# Patient Record
Sex: FEMALE | Race: WHITE | URBAN - METROPOLITAN AREA
[De-identification: names, ages, dates, MRNs, and addresses within clinical notes are randomized per-mention and may not be internally consistent; named-entity substitution may affect disease eponyms.]

---

## 2017-07-31 ENCOUNTER — APPOINTMENT (OUTPATIENT)
Age: 57
Setting detail: DERMATOLOGY
End: 2017-08-01

## 2017-07-31 DIAGNOSIS — L98.0 PYOGENIC GRANULOMA: ICD-10-CM

## 2017-07-31 PROCEDURE — OTHER BIOPSY BY SHAVE METHOD: OTHER

## 2017-07-31 PROCEDURE — OTHER COUNSELING: OTHER

## 2017-07-31 PROCEDURE — 11100: CPT

## 2017-07-31 PROCEDURE — OTHER MIPS QUALITY: OTHER

## 2017-07-31 ASSESSMENT — LOCATION SIMPLE DESCRIPTION DERM: LOCATION SIMPLE: RIGHT SMALL FINGER

## 2017-07-31 ASSESSMENT — LOCATION DETAILED DESCRIPTION DERM: LOCATION DETAILED: RIGHT SMALL FINGERTIP

## 2017-07-31 ASSESSMENT — LOCATION ZONE DERM: LOCATION ZONE: FINGER

## 2017-07-31 NOTE — PROCEDURE: MIPS QUALITY
Quality 130: Documentation Of Current Medications In The Medical Record: Current Medications Documented
Detail Level: Detailed
Quality 226: Preventive Care And Screening: Tobacco Use: Screening And Cessation Intervention: Patient screened for tobacco and never smoked
Quality 431: Preventive Care And Screening: Unhealthy Alcohol Use - Screening: Patient screened for unhealthy alcohol use using a single question and scores less than 2 times per year
Quality 265: Biopsy Follow-Up: Biopsy results reviewed, communicated, tracked, and documented
Quality 110: Preventive Care And Screening: Influenza Immunization: Influenza Immunization previously received during influenza season

## 2017-07-31 NOTE — PROCEDURE: BIOPSY BY SHAVE METHOD
Dressing: bandage
Hemostasis: Electrocautery
Bill For Surgical Tray: no
Anesthesia Volume In Cc (Will Not Render If 0): 0.2
Type Of Destruction Used: Electrodesiccation
Silver Nitrate Text: The wound bed was treated with silver nitrate after the biopsy was performed.
Post-Care Instructions: I reviewed with the patient in detail post-care instructions. Patient is to keep the biopsy site dry overnight, and then apply H2O2, Vaseline and a bandage daily until healed.
X Size Of Lesion In Cm: 0.8
Render Post-Care Instructions In Note?: yes
Notification Instructions: Patient will be notified of biopsy results. However, patient instructed to call the office if not contacted within 2 weeks.
Additional Anesthesia Volume In Cc (Will Not Render If 0): 0
Detail Level: Detailed
Billing Type: Third-Party Bill
Curettage Text: The wound bed was treated with curettage after the biopsy was performed.
Biopsy Type: H and E
Electrodesiccation And Curettage Text: The wound bed was treated with electrodesiccation and curettage after the biopsy was performed.
Electrodesiccation Text: The wound bed was treated with electrodesiccation after the biopsy was performed.
Biopsy Method: Double edge Personna blades
Consent: Written consent was obtained and risks were reviewed including but not limited to scarring, infection, bleeding, scabbing, incomplete removal, nerve damage and allergy to anesthesia.
Anesthesia Type: 1% lidocaine, 0.25% Marcaine, 1:600,000 epinephrine and a 1:10 solution of sodium bicarbonate
Cryotherapy Text: The wound bed was treated with cryotherapy after the biopsy was performed.
Wound Care: Vaseline
Body Location Override (Optional - Billing Will Still Be Based On Selected Body Map Location If Applicable): right distal 5th phalanx

## 2017-07-31 NOTE — HPI: SKIN LESION
Additional History: She previously saw Dr. Dario Solis for shave removal and electrodessication. When the lesion recurred he told the patient that it would need to be surgically removed and sutures would need to be used. The patient was here for an appointment with her mother and mentioned this lesion to Dr. Musa.

## 2024-01-22 ENCOUNTER — NURSE TRIAGE (OUTPATIENT)
Dept: NURSING | Facility: CLINIC | Age: 64
End: 2024-01-22

## 2024-01-22 ENCOUNTER — OFFICE VISIT (OUTPATIENT)
Dept: AUDIOLOGY | Facility: CLINIC | Age: 64
End: 2024-01-22
Payer: COMMERCIAL

## 2024-01-22 DIAGNOSIS — H90.3 SNHL (SENSORY-NEURAL HEARING LOSS), ASYMMETRICAL: Primary | ICD-10-CM

## 2024-01-22 PROCEDURE — 92565 STENGER TEST PURE TONE: CPT | Performed by: AUDIOLOGIST

## 2024-01-22 PROCEDURE — 92557 COMPREHENSIVE HEARING TEST: CPT | Performed by: AUDIOLOGIST

## 2024-01-22 PROCEDURE — 92550 TYMPANOMETRY & REFLEX THRESH: CPT | Performed by: AUDIOLOGIST

## 2024-01-22 NOTE — PROGRESS NOTES
AUDIOLOGY REPORT    SUMMARY: Audiology visit completed. See audiogram for results.      RECOMMENDATIONS: Follow-up with ENT.    Titus Palmer, CCC-A  [place] Licensed Audiologist #[id number]

## 2024-01-22 NOTE — TELEPHONE ENCOUNTER
Maryana is currently in Urgent Care and is not able to make the Audiology appt that was previously made for her.   She is being seen in Urgent Care for another issue.  RN cancelled her appt and rescheduled her for:   Ramila Lind at [phone] [place] [address]  '[address]

## 2024-01-22 NOTE — TELEPHONE ENCOUNTER
"  Nurse Triage SBAR    Is this a 2nd Level Triage? YES, LICENSED PRACTITIONER REVIEW IS REQUIRED    Situation: Right ear hearing loss    Background: One week ago noted \"machine/fan\" running in her head  Hearing more deeply in head instead of outside for several days  On Friday babsat in loud environment and following heard the louder fan in head  On Sat she put in earplug to see if that helped because she felt like noise aggravated it. Since removing the earplug she hears very little. She cannot hear on the phone , TV is hard to hear.     Assessment: Hearing loss    Protocol Recommended Disposition:   See in Office Today or Tomorrow    Recommendation: Appointment made for audiogram today at [facility]. Clinic information given. Asked her to bring her insurance card. Asked her to call back if she cannot make the appt or anything changes.      Routed to provider   Sudden Hearing Loss Patient scheduled.    Date & Time of Audiogram: 1/22/24  11:30 am    Location of Audiogram: [facility]    How the patient wants to be contacted by the team after the audiogram is reviewed: Phone [phone].     Patient has vacation plans on 1/24 and needs to know if she can still go      Lanny Alarcon RN  P Red Flag Triage/MRT       Additional Information   Negative: Followed an ear injury   Negative: Decreased hearing with nasal allergies   Negative: Part of a cold   Negative: Follows air travel or mountain driving   Negative: Earwax, questions about   Negative: Dizziness is the main symptom   Negative: Patient sounds very sick or weak to the triager   Negative: Ringing in the ears (tinnitus) and taking aspirin and dosage sounds high (i.e., > 1500 mg/day)   Hearing loss in one or both ears of sudden onset and present now    Answer Assessment - Initial Assessment Questions  1. DESCRIPTION: \"What type of hearing problem are you having? Describe it for me.\" (e.g., complete hearing loss, partial loss)      Very faint noises  2. LOCATION: \"One or " "both ears?\" If one, ask: \"Which ear?\"      Right ear  3. SEVERITY: \"Can you hear anything?\" If so, ask: \"What can you hear?\" (e.g., ticking watch, whisper, talking)      No whisper, no TV  4. ONSET: \"When did this begin?\" \"Did it start suddenly or come on gradually?\"      One week ago  5. PATTERN: \"Does this come and go, or has it been constant since it started?\"      constant  6. PAIN: \"Is there any pain in your ear(s)?\"  (Scale 1-10; or mild, moderate, severe)      no  7. CAUSE: \"What do you think is causing this hearing problem?\"      Not sure  8. OTHER SYMPTOMS: \"Do you have any other symptoms?\" (e.g., dizziness, ringing in ears)      Slight headache, back of head  9. PREGNANCY: \"Is there any chance you are pregnant?\" \"When was your last menstrual period?\"      na    Protocols used: Hearing Loss-A-[place]    "

## 2024-01-23 ENCOUNTER — TELEPHONE (OUTPATIENT)
Dept: OTOLARYNGOLOGY | Facility: CLINIC | Age: 64
End: 2024-01-23
Payer: COMMERCIAL

## 2024-01-23 DIAGNOSIS — H90.3 ASYMMETRICAL SENSORINEURAL HEARING LOSS: Primary | ICD-10-CM

## 2024-01-23 RX ORDER — PREDNISONE 10 MG/1
TABLET ORAL
Qty: 75 TABLET | Refills: 0 | Status: SHIPPED | OUTPATIENT
Start: 2024-01-23 | End: 2024-02-07

## 2024-01-23 NOTE — TELEPHONE ENCOUNTER
Writer called Maryana to discuss her audiogram. Briefly discussed possible etiologies of sudden hearing loss and treatment of steroids. Explained the purpose of an MRI and both will be ordered today. She is going on vacation for a week and let her know it is acceptable for her to wait until she returns for the MRI. Additionally, she will have a repeat audiogram in 2 weeks after she has completed the steroid course. With a word rec of 36% in the bad ear, she has a better prognosis for some recovery of hearing compared to a word rec of 0%. We will check in with her later today as she is very anxious. She mentioned claustrophobia but has some benzodiazepines she can take prior to MRI.    Hilda Becker PA-C pg via [facility] or [employer]  Otolaryngology-Head & Neck Surgery  Please contact ENT by dialing [phone] and entering job code [phone].

## 2024-01-24 ENCOUNTER — TELEPHONE (OUTPATIENT)
Dept: OTOLARYNGOLOGY | Facility: CLINIC | Age: 64
End: 2024-01-24
Payer: COMMERCIAL

## 2024-01-25 NOTE — TELEPHONE ENCOUNTER
Called and spoke to patient regarding steroids, MRI, and audiogram. Informed patient that audiogram should be done in about 2 weeks, MRI whenever is convenient for her (scheduled for 2/20/24), and to finish steroid doses (should be finished 2/6/24). Schedulers will call patient to schedule audiogram sometime around the dates of 2/7-9. Patient had no further questions or concerns at this time.     Glendy Streeter, RN, BSN  RN Care Coordinator, ENT Clinic

## 2024-01-29 ENCOUNTER — TELEPHONE (OUTPATIENT)
Dept: OTOLARYNGOLOGY | Facility: CLINIC | Age: 64
End: 2024-01-29
Payer: COMMERCIAL

## 2024-02-07 ENCOUNTER — OFFICE VISIT (OUTPATIENT)
Dept: AUDIOLOGY | Facility: CLINIC | Age: 64
End: 2024-02-07
Payer: COMMERCIAL

## 2024-02-07 ENCOUNTER — OFFICE VISIT (OUTPATIENT)
Dept: OTOLARYNGOLOGY | Facility: CLINIC | Age: 64
End: 2024-02-07
Payer: COMMERCIAL

## 2024-02-07 VITALS — DIASTOLIC BLOOD PRESSURE: 79 MMHG | SYSTOLIC BLOOD PRESSURE: 172 MMHG

## 2024-02-07 DIAGNOSIS — Z86.69 H/O SUDDEN HEARING LOSS: ICD-10-CM

## 2024-02-07 DIAGNOSIS — H90.3 ASYMMETRICAL SENSORINEURAL HEARING LOSS: Primary | ICD-10-CM

## 2024-02-07 DIAGNOSIS — H90.3 SNHL (SENSORY-NEURAL HEARING LOSS), ASYMMETRICAL: Primary | ICD-10-CM

## 2024-02-07 PROCEDURE — 92557 COMPREHENSIVE HEARING TEST: CPT | Performed by: AUDIOLOGIST

## 2024-02-07 PROCEDURE — 92550 TYMPANOMETRY & REFLEX THRESH: CPT | Performed by: AUDIOLOGIST

## 2024-02-07 PROCEDURE — 99204 OFFICE O/P NEW MOD 45 MIN: CPT | Performed by: OTOLARYNGOLOGY

## 2024-02-07 ASSESSMENT — ENCOUNTER SYMPTOMS
EYES NEGATIVE: 1
GASTROINTESTINAL NEGATIVE: 1
CONSTITUTIONAL NEGATIVE: 1
RESPIRATORY NEGATIVE: 1
DIZZINESS: 0
NERVOUS/ANXIOUS: 1

## 2024-02-07 NOTE — PROGRESS NOTES
AUDIOLOGY REPORT    SUMMARY: Audiology visit completed. See audiogram for results.    RECOMMENDATIONS: Follow-up with ENT.    Titus Daley  Doctor of Audiology  [place] License # [id number]

## 2024-02-07 NOTE — PROGRESS NOTES
Chief Complaint   Patient presents with    New Patient     sudden hearing loss      PCP: No Ref-Primary, Physician     Referring Provider: No ref. provider found    BP (!) 172/79 (BP Location: Right arm, Patient Position: Sitting, Cuff Size: Adult Regular)     ENT Problem List:  There is no problem list on file for this patient.     Current Medications:  Current Outpatient Medications   Medication    predniSONE (DELTASONE) 10 MG tablet     No current facility-administered medications for this visit.     HPI  Pleasant 63 year old female presents today as a(n) new patient for sudden hearing loss and tinnitus in the right ear that onset 3 weeks ago on 1/15/2024. She reports a muffled sound that gets worse with a lot of simulation, and it feels like there is a door shut on her ear. These symptoms are causing her anxiety. She denies head trauma, and hypertension. She states that she has prediabetes. She had a low grade fever of 99 twice recently, which both went away with Tylenol. She is currently being treated with prednisone with no improvement. She denies otalgia, aural fullness, otorrhea, and dizziness. No weakness, numbness, tingling, trauma hx of vision issues.    Review of Systems   Constitutional: Negative.    HENT:  Positive for hearing loss and tinnitus. Negative for ear discharge and ear pain.    Eyes: Negative.    Respiratory: Negative.     Gastrointestinal: Negative.    Skin: Negative.    Neurological:  Negative for dizziness.   Endo/Heme/Allergies: Negative.    Psychiatric/Behavioral:  The patient is nervous/anxious.        Physical Exam  Vitals and nursing note reviewed.   Constitutional:       Appearance: Normal appearance.   HENT:      Head: Normocephalic and atraumatic.      Jaw: There is normal jaw occlusion.      Right Ear: Hearing, tympanic membrane and ear canal normal.      Left Ear: Hearing, tympanic membrane and ear canal normal.      Nose: No mucosal edema, congestion or rhinorrhea.      Right  Nostril: No occlusion.      Left Nostril: No occlusion.      Right Turbinates: Not enlarged or swollen.      Left Turbinates: Not enlarged or swollen.      Right Sinus: No maxillary sinus tenderness or frontal sinus tenderness.      Left Sinus: No maxillary sinus tenderness or frontal sinus tenderness.      Mouth/Throat:      Mouth: Mucous membranes are moist.      Pharynx: Oropharynx is clear. Uvula midline.   Eyes:      Extraocular Movements: Extraocular movements intact.      Pupils: Pupils are equal, round, and reactive to light.   Neurological:      Mental Status: She is alert.     AUDIOGRAM: The patient underwent an audiogram today.  Right: Speech reception threshold is 40 dB with 92% word recognition. Tympanogram A type.  Left: Speech reception threshold is 0 dB with 100% word recognition. Tympanogram A type.    A/P  Audiogram was independently reviewed and discussed in detail with the patient. There might be some clogging in a vessel in the ear, or viral ear infections. A direct number was given for the patient to schedule an MRI as soon as possible for further investigation, since she is currently scheduled several weeks out. Potential further treatments and risks are thoroughly discussed with the patient, such as a high dose steroid directed through the membrane in the middle ear, hyperbaric oxygen, or hearing aids. The patient is anxious, therefore they are reassured that they have time to think about the treatment options and can call back on the direct number given when they are ready.    Scribe/Staff:    Scribe Disclosure:   I, Barbara España, am serving as a scribe; to document services personally performed by Cherie Gray MD based on data collection and the provider's statements to me.     Provider Disclosure:  I agree with above History, Review of Systems, Physical exam and Plan.  I have reviewed the content of the documentation and have edited it as needed. I have personally performed the  services documented here and the documentation accurately represents those services and the decisions I have made.      Electronically signed by:  Cherie Gray MD

## 2024-02-07 NOTE — NURSING NOTE
Maryana Judd's chief complaint for this visit includes:  Chief Complaint   Patient presents with    New Patient     sudden hearing loss     PCP: No Ref-Primary, Physician    Referring Provider:  No referring provider defined for this encounter.    BP (!) 172/79 (BP Location: Right arm, Patient Position: Sitting, Cuff Size: Adult Regular)   Data Unavailable      No Known Allergies      Do you need any medication refills at today's visit? No    Lisa montanez Clinic Assistant- Surgical Specialties

## 2024-02-07 NOTE — LETTER
2/7/2024         RE: Maryana Judd  [address]  [facility] [address]        Dear Colleague,    Thank you for referring your patient, Maryana Judd, to the [facility]. Please see a copy of my visit note below.    Chief Complaint   Patient presents with     New Patient     sudden hearing loss      PCP: No Ref-Primary, Physician     Referring Provider: No ref. provider found    BP (!) 172/79 (BP Location: Right arm, Patient Position: Sitting, Cuff Size: Adult Regular)     ENT Problem List:  There is no problem list on file for this patient.     Current Medications:  Current Outpatient Medications   Medication     predniSONE (DELTASONE) 10 MG tablet     No current facility-administered medications for this visit.     HPI  Pleasant 63 year old female presents today as a(n) new patient for sudden hearing loss and tinnitus in the right ear that onset 3 weeks ago on 1/15/2024. She reports a muffled sound that gets worse with a lot of simulation, and it feels like there is a door shut on her ear. These symptoms are causing her anxiety. She denies head trauma, and hypertension. She states that she has prediabetes. She had a low grade fever of 99 twice recently, which both went away with Tylenol. She is currently being treated with prednisone with no improvement. She denies otalgia, aural fullness, otorrhea, and dizziness. No weakness, numbness, tingling, trauma hx of vision issues.    Review of Systems   Constitutional: Negative.    HENT:  Positive for hearing loss and tinnitus. Negative for ear discharge and ear pain.    Eyes: Negative.    Respiratory: Negative.     Gastrointestinal: Negative.    Skin: Negative.    Neurological:  Negative for dizziness.   Endo/Heme/Allergies: Negative.    Psychiatric/Behavioral:  The patient is nervous/anxious.        Physical Exam  Vitals and nursing note reviewed.   Constitutional:       Appearance: Normal appearance.   HENT:      Head: Normocephalic and  atraumatic.      Jaw: There is normal jaw occlusion.      Right Ear: Hearing, tympanic membrane and ear canal normal.      Left Ear: Hearing, tympanic membrane and ear canal normal.      Nose: No mucosal edema, congestion or rhinorrhea.      Right Nostril: No occlusion.      Left Nostril: No occlusion.      Right Turbinates: Not enlarged or swollen.      Left Turbinates: Not enlarged or swollen.      Right Sinus: No maxillary sinus tenderness or frontal sinus tenderness.      Left Sinus: No maxillary sinus tenderness or frontal sinus tenderness.      Mouth/Throat:      Mouth: Mucous membranes are moist.      Pharynx: Oropharynx is clear. Uvula midline.   Eyes:      Extraocular Movements: Extraocular movements intact.      Pupils: Pupils are equal, round, and reactive to light.   Neurological:      Mental Status: She is alert.     AUDIOGRAM: The patient underwent an audiogram today.  Right: Speech reception threshold is 40 dB with 92% word recognition. Tympanogram A type.  Left: Speech reception threshold is 0 dB with 100% word recognition. Tympanogram A type.    A/P  Audiogram was independently reviewed and discussed in detail with the patient. There might be some clogging in a vessel in the ear, or viral ear infections. A direct number was given for the patient to schedule an MRI as soon as possible for further investigation, since she is currently scheduled several weeks out. Potential further treatments and risks are thoroughly discussed with the patient, such as a high dose steroid directed through the membrane in the middle ear, hyperbaric oxygen, or hearing aids. The patient is anxious, therefore they are reassured that they have time to think about the treatment options and can call back on the direct number given when they are ready.    Scribe/Staff:    Scribe Disclosure:   BRIAN, Barbara España, am serving as a scribe; to document services personally performed by Cherie Gray MD based on data collection  and the provider's statements to me.     Provider Disclosure:  I agree with above History, Review of Systems, Physical exam and Plan.  I have reviewed the content of the documentation and have edited it as needed. I have personally performed the services documented here and the documentation accurately represents those services and the decisions I have made.      Electronically signed by:  Cherie Gray MD      Again, thank you for allowing me to participate in the care of your patient.        Sincerely,        Cherie Gray MD

## 2024-02-09 ENCOUNTER — TELEPHONE (OUTPATIENT)
Dept: OTOLARYNGOLOGY | Facility: CLINIC | Age: 64
End: 2024-02-09

## 2024-02-09 DIAGNOSIS — H90.3 SNHL (SENSORY-NEURAL HEARING LOSS), ASYMMETRICAL: Primary | ICD-10-CM

## 2024-02-09 DIAGNOSIS — Z86.69 H/O SUDDEN HEARING LOSS: ICD-10-CM

## 2024-02-09 RX ORDER — METHYLPREDNISOLONE 4 MG
TABLET, DOSE PACK ORAL
Qty: 21 TABLET | Refills: 0 | Status: SHIPPED | OUTPATIENT
Start: 2024-02-09

## 2024-02-09 NOTE — TELEPHONE ENCOUNTER
Phone call received from pt stating her tinnitus has returned to first day of hearing loss levels, which she states is loud and severe.  She states she finished initial prednisone taper on Monday, and tinnitus started to increase Tuesday through now when it is very loud.  Reviewed  symptoms with Dr Gray, who recommends a medrol dose pack to start today, and that pt should consider intratympanic dexamethasone injections. Pt verbalized agreement with plan, will start medication today and is scheduled later this month for intratympanic dexamethasone injections.  Kortney Fernandez RN

## 2024-02-12 DIAGNOSIS — F40.240 CLAUSTROPHOBIA: ICD-10-CM

## 2024-02-12 DIAGNOSIS — H90.3 SNHL (SENSORY-NEURAL HEARING LOSS), ASYMMETRICAL: Primary | ICD-10-CM

## 2024-02-13 RX ORDER — DIAZEPAM 5 MG
TABLET ORAL
Qty: 2 TABLET | Refills: 0 | Status: SHIPPED | OUTPATIENT
Start: 2024-02-13 | End: 2024-02-20

## 2024-02-17 ENCOUNTER — ANCILLARY PROCEDURE (OUTPATIENT)
Dept: MRI IMAGING | Facility: CLINIC | Age: 64
End: 2024-02-17
Attending: PHYSICIAN ASSISTANT
Payer: COMMERCIAL

## 2024-02-17 DIAGNOSIS — H90.3 ASYMMETRICAL SENSORINEURAL HEARING LOSS: ICD-10-CM

## 2024-02-17 PROCEDURE — A9585 GADOBUTROL INJECTION: HCPCS | Performed by: RADIOLOGY

## 2024-02-17 PROCEDURE — 70553 MRI BRAIN STEM W/O & W/DYE: CPT | Mod: GC | Performed by: RADIOLOGY

## 2024-02-17 RX ORDER — GADOBUTROL 604.72 MG/ML
10 INJECTION INTRAVENOUS ONCE
Status: COMPLETED | OUTPATIENT
Start: 2024-02-17 | End: 2024-02-17

## 2024-02-17 RX ADMIN — GADOBUTROL 9 ML: 604.72 INJECTION INTRAVENOUS at 08:44

## 2024-02-19 ENCOUNTER — OFFICE VISIT (OUTPATIENT)
Dept: AUDIOLOGY | Facility: CLINIC | Age: 64
End: 2024-02-19
Payer: COMMERCIAL

## 2024-02-19 DIAGNOSIS — H90.3 SENSORINEURAL HEARING LOSS (SNHL) OF BOTH EARS: Primary | ICD-10-CM

## 2024-02-19 PROCEDURE — 92557 COMPREHENSIVE HEARING TEST: CPT | Performed by: AUDIOLOGIST

## 2024-02-19 PROCEDURE — 92550 TYMPANOMETRY & REFLEX THRESH: CPT | Performed by: AUDIOLOGIST

## 2024-02-19 NOTE — PROGRESS NOTES
AUDIOLOGY REPORT    SUMMARY: Audiology visit completed. See audiogram for results.    RECOMMENDATIONS: Follow-up with ENT.    Titus Daley  Doctor of Audiology  [place] License # [id number]

## 2024-02-20 ENCOUNTER — OFFICE VISIT (OUTPATIENT)
Dept: OTOLARYNGOLOGY | Facility: CLINIC | Age: 64
End: 2024-02-20
Payer: COMMERCIAL

## 2024-02-20 VITALS — SYSTOLIC BLOOD PRESSURE: 154 MMHG | DIASTOLIC BLOOD PRESSURE: 76 MMHG | HEART RATE: 80 BPM

## 2024-02-20 DIAGNOSIS — Z86.69 H/O SUDDEN HEARING LOSS: ICD-10-CM

## 2024-02-20 DIAGNOSIS — H90.3 SNHL (SENSORY-NEURAL HEARING LOSS), ASYMMETRICAL: Primary | ICD-10-CM

## 2024-02-20 PROCEDURE — 99214 OFFICE O/P EST MOD 30 MIN: CPT | Performed by: OTOLARYNGOLOGY

## 2024-02-20 ASSESSMENT — ENCOUNTER SYMPTOMS
CONSTITUTIONAL NEGATIVE: 1
EYES NEGATIVE: 1
RESPIRATORY NEGATIVE: 1
GASTROINTESTINAL NEGATIVE: 1

## 2024-02-20 NOTE — LETTER
2/20/2024         RE: Maryana Judd  [address]  [facility] [address]        Dear Colleague,    Thank you for referring your patient, Maryana Judd, to the [facility]. Please see a copy of my visit note below.    Chief Complaint   Patient presents with     Follow Up     Here to start Dexa injections to the right ear. Hearing is improved. Mri and Audio done. Go over results.       PCP: No Ref-Primary, Physician     Referring Provider: No ref. provider found    BP (!) 154/76   Pulse 80     ENT Problem List:  There is no problem list on file for this patient.     Current Medications:  Current Outpatient Medications   Medication     methylPREDNISolone (MEDROL DOSEPAK) 4 MG tablet therapy pack     No current facility-administered medications for this visit.     Brain MR of the brainstem/internal auditory canals without and with contrast. Date: 2/17/24     Provided History: Asymmetrical sensorineural hearing loss.  EMR: Sudden hearing loss of right hear  Comparison:   No similar prior imaging      Technique: Axial diffusion, FLAIR, and susceptibility-weighted images of the whole brain were obtained. Coronal 3D T2-weighted and axial thin-section T1-weighted images were obtained without contrast, with focus on the internal auditory canals.  Post-intravenous contrast (using gadolinium) axial and coronal thin-section T1-weighted images with fat saturation were also obtained, with focus on the internal auditory canals, with postcontrast T1-weighted images of the whole brain as well.    Contrast: 9 mL Gadavist     Findings:   No definite abnormal signal is present in the cerebellum or brainstem. The seventh and eighth cranial nerves appear normal along their course intracranially, and the labyrinthine structures are grossly unremarkable on the T2-weighted images. The ventricles appear normal in size for the patient's age.     Postcontrast images demonstrate no definite abnormal enhancement  along the seventh or eighth cranial nerves along their course or of the labyrinthine structures. No definite abnormal enhancement is visualized elsewhere intracranially. Mild scattered periventricular hyperintensities likely related to chronic small vessel ischemia given age.                                                                   Impression:  1. No definite abnormality of the seventh or eighth cranial nerves  intracranially.  2. Likely mild chronic small vessel ischemia in the white matter.    HPI  Pleasant 64 year old female presents today as a(n) established patient to discuss possibly starting Dexa injections to the right ear, and to go over the results of her MRI and audiogram. She started experiencing hearing loss and loud noises in her ears in January, but her condition has improved with methylPREDNISolone (MEDROL DOSEPAK) 4 MG. She reports that her breathing is good. Denies any otalgia, otorrhea, dizziness or vertigo.    Review of Systems   Constitutional: Negative.    HENT:  Positive for hearing loss and tinnitus.    Eyes: Negative.    Respiratory: Negative.     Gastrointestinal: Negative.    Skin: Negative.    Endo/Heme/Allergies: Negative.        Physical Exam  Vitals and nursing note reviewed.   Constitutional:       Appearance: Normal appearance.   HENT:      Head: Normocephalic and atraumatic.      Jaw: There is normal jaw occlusion.      Right Ear: Hearing, tympanic membrane and ear canal normal.      Left Ear: Hearing, tympanic membrane and ear canal normal.      Nose: Septal deviation present. No mucosal edema, congestion or rhinorrhea.      Right Nostril: No occlusion.      Left Nostril: No occlusion.      Right Turbinates: Not enlarged or swollen.      Left Turbinates: Not enlarged or swollen.      Right Sinus: No maxillary sinus tenderness or frontal sinus tenderness.      Left Sinus: No maxillary sinus tenderness or frontal sinus tenderness.      Mouth/Throat:      Mouth: Mucous  membranes are moist.      Pharynx: Oropharynx is clear. Uvula midline.   Eyes:      Extraocular Movements: Extraocular movements intact.      Pupils: Pupils are equal, round, and reactive to light.   Neurological:      Mental Status: She is alert.       AUDIOGRAM: The patient underwent an audiogram 2/19/24.  Right: Speech reception threshold is 10 dB with 100% word recognition.  Left: Speech reception threshold is 5 dB with 100% word recognition.    A/P  Audiogram/Imaging was independently reviewed and discussed in detail with the patient. Since the patient's hearing has improved, she does not want or need to start Dexa injections at this time.  For nasal congestion, she is recommended to use saline irrigation in her nostrils. She is recommended to eat a healthy diet with good hydration, which includes avoiding fast food, artifical flavors, and caffeine. She is also told to avoid acoustic trauma.    Follow up in clinic in 4 months, or sooner if she starts experiencing hearing loss and loud noises in her ears again.    Scribe/Staff:    Scribe Disclosure:   I, Barbara España, am serving as a scribe; to document services personally performed by Cherie Gray MD based on data collection and the provider's statements to me.     Provider Disclosure:  I agree with above History, Review of Systems, Physical exam and Plan.  I have reviewed the content of the documentation and have edited it as needed. I have personally performed the services documented here and the documentation accurately represents those services and the decisions I have made.      Electronically signed by:  Cherie Judd's chief complaint for this visit includes:  Chief Complaint   Patient presents with     Follow Up     Here to start Dexa injections to the right ear. Hearing is improved. Mri and Audio done. Go over results.      PCP: No Ref-Primary, Physician    Referring Provider:  No referring provider defined for  this encounter.    BP (!) 154/76   Pulse 80             Again, thank you for allowing me to participate in the care of your patient.        Sincerely,        Cherie Gray MD

## 2024-02-20 NOTE — PROGRESS NOTES
Chief Complaint   Patient presents with    Follow Up     Here to start Dexa injections to the right ear. Hearing is improved. Mri and Audio done. Go over results.       PCP: No Ref-Primary, Physician     Referring Provider: No ref. provider found    BP (!) 154/76   Pulse 80     ENT Problem List:  There is no problem list on file for this patient.     Current Medications:  Current Outpatient Medications   Medication    methylPREDNISolone (MEDROL DOSEPAK) 4 MG tablet therapy pack     No current facility-administered medications for this visit.     Brain MR of the brainstem/internal auditory canals without and with contrast. Date: 2/17/24     Provided History: Asymmetrical sensorineural hearing loss.  EMR: Sudden hearing loss of right hear  Comparison:   No similar prior imaging      Technique: Axial diffusion, FLAIR, and susceptibility-weighted images of the whole brain were obtained. Coronal 3D T2-weighted and axial thin-section T1-weighted images were obtained without contrast, with focus on the internal auditory canals.  Post-intravenous contrast (using gadolinium) axial and coronal thin-section T1-weighted images with fat saturation were also obtained, with focus on the internal auditory canals, with postcontrast T1-weighted images of the whole brain as well.    Contrast: 9 mL Gadavist     Findings:   No definite abnormal signal is present in the cerebellum or brainstem. The seventh and eighth cranial nerves appear normal along their course intracranially, and the labyrinthine structures are grossly unremarkable on the T2-weighted images. The ventricles appear normal in size for the patient's age.     Postcontrast images demonstrate no definite abnormal enhancement along the seventh or eighth cranial nerves along their course or of the labyrinthine structures. No definite abnormal enhancement is visualized elsewhere intracranially. Mild scattered periventricular hyperintensities likely related to chronic small  vessel ischemia given age.                                                                   Impression:  1. No definite abnormality of the seventh or eighth cranial nerves  intracranially.  2. Likely mild chronic small vessel ischemia in the white matter.    HPI  Pleasant 64 year old female presents today as a(n) established patient to discuss possibly starting Dexa injections to the right ear, and to go over the results of her MRI and audiogram. She started experiencing hearing loss and loud noises in her ears in January, but her condition has improved with methylPREDNISolone (MEDROL DOSEPAK) 4 MG. She reports that her breathing is good. Denies any otalgia, otorrhea, dizziness or vertigo.    Review of Systems   Constitutional: Negative.    HENT:  Positive for hearing loss and tinnitus.    Eyes: Negative.    Respiratory: Negative.     Gastrointestinal: Negative.    Skin: Negative.    Endo/Heme/Allergies: Negative.        Physical Exam  Vitals and nursing note reviewed.   Constitutional:       Appearance: Normal appearance.   HENT:      Head: Normocephalic and atraumatic.      Jaw: There is normal jaw occlusion.      Right Ear: Hearing, tympanic membrane and ear canal normal.      Left Ear: Hearing, tympanic membrane and ear canal normal.      Nose: Septal deviation present. No mucosal edema, congestion or rhinorrhea.      Right Nostril: No occlusion.      Left Nostril: No occlusion.      Right Turbinates: Not enlarged or swollen.      Left Turbinates: Not enlarged or swollen.      Right Sinus: No maxillary sinus tenderness or frontal sinus tenderness.      Left Sinus: No maxillary sinus tenderness or frontal sinus tenderness.      Mouth/Throat:      Mouth: Mucous membranes are moist.      Pharynx: Oropharynx is clear. Uvula midline.   Eyes:      Extraocular Movements: Extraocular movements intact.      Pupils: Pupils are equal, round, and reactive to light.   Neurological:      Mental Status: She is alert.        AUDIOGRAM: The patient underwent an audiogram 2/19/24.  Right: Speech reception threshold is 10 dB with 100% word recognition.  Left: Speech reception threshold is 5 dB with 100% word recognition.    A/P  Audiogram/Imaging was independently reviewed and discussed in detail with the patient. Since the patient's hearing has improved, she does not want or need to start Dexa injections at this time.  For nasal congestion, she is recommended to use saline irrigation in her nostrils. She is recommended to eat a healthy diet with good hydration, which includes avoiding fast food, artifical flavors, and caffeine. She is also told to avoid acoustic trauma.    Follow up in clinic in 4 months, or sooner if she starts experiencing hearing loss and loud noises in her ears again.    Scribe/Staff:    Scribe Disclosure:   I, Barbara Patria, am serving as a scribe; to document services personally performed by Cherie Gray MD based on data collection and the provider's statements to me.     Provider Disclosure:  I agree with above History, Review of Systems, Physical exam and Plan.  I have reviewed the content of the documentation and have edited it as needed. I have personally performed the services documented here and the documentation accurately represents those services and the decisions I have made.      Electronically signed by:  Cherie Gray MD

## 2024-02-20 NOTE — NURSING NOTE
Maryana Judd's chief complaint for this visit includes:  Chief Complaint   Patient presents with    Follow Up     Here to start Dexa injections to the right ear. Hearing is improved. Mri and Audio done. Go over results.      PCP: No Ref-Primary, Physician    Referring Provider:  No referring provider defined for this encounter.    BP (!) 154/76   Pulse 80

## 2024-02-22 ENCOUNTER — MYC MEDICAL ADVICE (OUTPATIENT)
Dept: OTOLARYNGOLOGY | Facility: CLINIC | Age: 64
End: 2024-02-22

## 2024-03-25 ENCOUNTER — TELEPHONE (OUTPATIENT)
Dept: OTOLARYNGOLOGY | Facility: CLINIC | Age: 64
End: 2024-03-25
Payer: COMMERCIAL

## 2024-03-25 NOTE — TELEPHONE ENCOUNTER
Phone call to pt who states last Tuesday she experienced dizziness/vertigo after getting out of bed.  She states it lasted about 4 hours with nausea/vomiting.  She attempted to perform the epply maneuver on her own, but found that when she tilted her head back, the vertigo became very strong.  She has not had an episode since.  Discussed maintaining good hydration and limiting salt,caffeine and alcohol. Encouraged pt to call back if episodes recur for possible vestibular rehab referral. Pt verbalized understanding of plan.  Kortney Fernandez RN

## 2024-03-25 NOTE — TELEPHONE ENCOUNTER
Phone message received from pt stating she has been experiencing some dizziness/vertigo with nausea, and is wondering if it is related to her recent sudden hearing loss, now assymetrical hearing loss.  Phone call to pt, left message asking her to call back clinic.  Kortney Fernandez RN

## 2024-04-07 ENCOUNTER — HEALTH MAINTENANCE LETTER (OUTPATIENT)
Age: 64
End: 2024-04-07

## 2024-04-23 ENCOUNTER — TELEPHONE (OUTPATIENT)
Dept: OTOLARYNGOLOGY | Facility: CLINIC | Age: 64
End: 2024-04-23
Payer: COMMERCIAL

## 2024-04-23 NOTE — TELEPHONE ENCOUNTER
Patient called and left voice mail, what treatment does she need for SNHL. Ha appt scheduled for 7/2024 and audio.   Marija BUSBY

## 2024-04-24 NOTE — TELEPHONE ENCOUNTER
Phone call to pt, discussed that pt's insurance does not cover this facility.  Pt states she scheduled an appointment with another ENT office for early May, for hearing recheck.  Asked pt to call back with any concerns in the interim. Kortney Fernandez RN

## 2025-04-19 ENCOUNTER — HEALTH MAINTENANCE LETTER (OUTPATIENT)
Age: 65
End: 2025-04-19